# Patient Record
Sex: FEMALE | Race: WHITE | NOT HISPANIC OR LATINO | Employment: OTHER | URBAN - METROPOLITAN AREA
[De-identification: names, ages, dates, MRNs, and addresses within clinical notes are randomized per-mention and may not be internally consistent; named-entity substitution may affect disease eponyms.]

---

## 2023-05-16 ENCOUNTER — CONSULT (OUTPATIENT)
Dept: PAIN MEDICINE | Facility: CLINIC | Age: 88
End: 2023-05-16

## 2023-05-16 VITALS
HEART RATE: 57 BPM | WEIGHT: 98 LBS | TEMPERATURE: 97.5 F | DIASTOLIC BLOOD PRESSURE: 64 MMHG | SYSTOLIC BLOOD PRESSURE: 110 MMHG

## 2023-05-16 DIAGNOSIS — Z87.39 HISTORY OF BURNING PAIN IN LEG: ICD-10-CM

## 2023-05-16 DIAGNOSIS — S22.060A CLOSED WEDGE COMPRESSION FRACTURE OF T8 VERTEBRA, INITIAL ENCOUNTER (HCC): Primary | ICD-10-CM

## 2023-05-16 DIAGNOSIS — M79.604 LEG PAIN, BILATERAL: ICD-10-CM

## 2023-05-16 DIAGNOSIS — M79.605 LEG PAIN, BILATERAL: ICD-10-CM

## 2023-05-16 PROBLEM — M05.79 RHEUMATOID ARTHRITIS INVOLVING MULTIPLE SITES WITH POSITIVE RHEUMATOID FACTOR (HCC): Status: ACTIVE | Noted: 2023-05-16

## 2023-05-16 PROBLEM — N18.32 STAGE 3B CHRONIC KIDNEY DISEASE (HCC): Status: ACTIVE | Noted: 2022-02-17

## 2023-05-16 RX ORDER — CHLORAL HYDRATE 500 MG
1000 CAPSULE ORAL DAILY
COMMUNITY

## 2023-05-16 RX ORDER — ACETAMINOPHEN 650 MG/1
650 SUPPOSITORY RECTAL EVERY 4 HOURS PRN
COMMUNITY

## 2023-05-16 RX ORDER — HYDROXYCHLOROQUINE SULFATE 200 MG/1
200 TABLET, FILM COATED ORAL DAILY
COMMUNITY
Start: 2023-03-21

## 2023-05-16 RX ORDER — GABAPENTIN 100 MG/1
CAPSULE ORAL
Qty: 120 CAPSULE | Refills: 0 | Status: SHIPPED | OUTPATIENT
Start: 2023-05-16

## 2023-05-16 RX ORDER — METOPROLOL SUCCINATE 50 MG/1
50 TABLET, EXTENDED RELEASE ORAL 2 TIMES DAILY
COMMUNITY
Start: 2023-04-09

## 2023-05-16 RX ORDER — HYDRALAZINE HYDROCHLORIDE 10 MG/1
10 TABLET, FILM COATED ORAL 3 TIMES DAILY
COMMUNITY

## 2023-05-16 RX ORDER — FUROSEMIDE 20 MG/1
TABLET ORAL
COMMUNITY
Start: 2023-04-19

## 2023-05-16 RX ORDER — GABAPENTIN 100 MG/1
CAPSULE ORAL
COMMUNITY
Start: 2023-05-12 | End: 2023-05-16 | Stop reason: SDUPTHER

## 2023-05-16 RX ORDER — TITANIUM DIOXIDE, OCTINOXATE, ZINC OXIDE 4.61; 1.6; .78 G/40ML; G/40ML; G/40ML
CREAM TOPICAL
COMMUNITY

## 2023-05-16 NOTE — PROGRESS NOTES
Assessment  1  Closed wedge compression fracture of T8 vertebra, initial encounter ([facility])    2  Leg pain, bilateral    3  History of burning pain in leg        Plan    Pleasant 80year-old female presents today, room for daughter  My recommendation are as follows:    Question is neuropathy versus lumbosacral radiculopathy as well as compression fracture  We will obtain MRI of the thoracic spine to further elucidation of the degree of stenosis secondary to compression fracture, prescription was provided  I think is reasonable obtain an EMG of the bilateral lower limbs to help better differentiate the etiology of her symptoms  Neuropathy versus lumbosacral radiculopathy, prescription was provided  Did slowly titrate the gabapentin to help with her symptoms  I did review the potential side effects of gabapentin, not limited to, but including dizziness, drowsiness, weakness, tired feeling, nausea, diarrhea, constipation, blurred vision, headache, swelling, dry mouth, or loss of balance or coordination  Once we obtain MRI results, I will follow-up with the patient, review the results and current symptoms, and discuss the next steps of the treatment plan  My impressions and treatment recommendations were discussed in detail with the patient who verbalized understanding and had no further questions  Discharge instructions were provided  I personally saw and examined the patient and I agree with the above discussed plan of care  This note is created using dictation transcription  It may contain typographical errors, grammatical errors, improperly dictated words, background noise and other errors  Orders Placed This Encounter   Procedures   • MRI thoracic spine without contrast     Standing Status:   Future     Standing Expiration Date:   5/16/2027     Scheduling Instructions: There is no preparation for this test  Please leave your jewelry and valuables at home, wedding rings are the exception  All patients will be required to change into a hospital gown and pants  Street clothes are not permitted in the MRI  Magnetic nail polish must be removed prior to arrival for your test  Please bring your insurance cards, a form of photo ID and a list of your medications with you  Arrive 15 minutes prior to your appointment time in order to register  Please bring any prior CT or MRI studies of this area that were not performed at a [facility] facility  To schedule this appointment, please contact Central Scheduling at [phone]  Prior to your appointment, please make sure you complete the MRI Screening Form when you e-Check in for your appointment  This will be available starting 7 days before your appointment in [address]  You may receive an e-mail with an activation code if you do not have a [email or web address] account  If you do not have access to a device, we will complete your screening at your appointment  Order Specific Question:   What is the patient's sedation requirement? Answer:   No Sedation     Order Specific Question:   Does this procedure require the 3T MRI at [facility] or [place]?     Answer:   No     Order Specific Question:   Release to patient through [employer]     Answer:   Immediate     Order Specific Question:   Is order priority selected as STAT? Answer:   No     Order Specific Question:   Reason for Exam (FREE TEXT)     Answer:   thoracic compression fracture T8   • EMG 2 limb lower extremity     Standing Status:   Future     Standing Expiration Date:   5/16/2024     Order Specific Question:   Possible Diagnosis:      Answer:   patient scheduled - unknown     New Medications Ordered This Visit   Medications   • furosemide (LASIX) 20 mg tablet     Sig: TAKE 1/2 TABLET DAILY IN THE MORNING AS DIRECTED   • hydroxychloroquine (PLAQUENIL) 200 mg tablet     Sig: Take 200 mg by mouth daily   • metoprolol succinate (TOPROL-XL) 50 mg 24 hr tablet     Sig: Take 50 mg by mouth 2 (two) times a day   • acetaminophen (TYLENOL) 650 mg suppository     Sig: Insert 650 mg into the rectum every 4 (four) hours as needed for mild pain   • Multiple Vitamins-Minerals (ONE DAILY 50 PLUS PO)     Sig: Take by mouth   • Omega-3 Fatty Acids (fish oil) 1,000 mg     Sig: Take 1,000 mg by mouth daily   • Aspirin 81 MG CAPS     Sig: Take by mouth   • Cranberry 400 MG CAPS     Sig: Take by mouth   • hydrALAZINE (APRESOLINE) 10 mg tablet     Sig: Take 10 mg by mouth 3 (three) times a day   • nystatin (MYCOSTATIN) 500,000 units/5 mL suspension     Sig: Apply 500,000 Units to the mouth or throat 4 (four) times a day   • fluocinonide (LIDEX) 0 05 % cream     Sig: Apply topically 2 (two) times a day   • gabapentin (NEURONTIN) 100 mg capsule     Sig: Take one in the Am ans one in the afternon and two at bedtime     Dispense:  120 capsule     Refill:  0     Referred By: Flaco Thomas MD  History of Present Illness    Sd Vale is a 80 y o  female 5-month history of bilateral lower extremity pain numbness and burning from her knees to her toes  She denies any back pain denies any thigh pain or burning  She was found to have a compression fracture of the thoracic spine  Her symptoms are moderate to severe she now is in a wheelchair occasionally uses a walker  She rates as 10 out of 10 the visual analog scale completely interfering with her daily activities  Pain is nearly constant scribes burning cramping and shooting with weakness of her lower limbs  Lying down decreases her symptoms while sitting and walking aggravates her pain  I have personally reviewed and/or updated the patient's past medical history, past surgical history, family history, social history, current medications, allergies, and vital signs today  Review of Systems   Constitutional: Positive for unexpected weight change  Negative for fever  HENT: Negative for trouble swallowing  Eyes: Negative for visual disturbance     Respiratory: Negative for shortness of breath and wheezing  Cardiovascular: Positive for leg swelling  Negative for chest pain and palpitations  Gastrointestinal: Negative for constipation, diarrhea, nausea and vomiting  Endocrine: Positive for polyuria  Negative for cold intolerance, heat intolerance and polydipsia  Genitourinary: Negative for difficulty urinating and frequency  Musculoskeletal: Positive for arthralgias, joint swelling and myalgias  Negative for gait problem  Skin: Positive for wound  Negative for rash  Neurological: Positive for numbness  Negative for dizziness, seizures, syncope, weakness and headaches  Hematological: Does not bruise/bleed easily  Psychiatric/Behavioral: Negative for dysphoric mood  All other systems reviewed and are negative  Patient Active Problem List   Diagnosis   • Stage 3b chronic kidney disease ([facility] )   • Rheumatoid arthritis involving multiple sites with positive rheumatoid factor ([facility] )       Past Medical History:   Diagnosis Date   • Arthritis        History reviewed  No pertinent surgical history  History reviewed  No pertinent family history      Social History     Occupational History   • Not on file   Tobacco Use   • Smoking status: Never   • Smokeless tobacco: Never   Vaping Use   • Vaping Use: Never used   Substance and Sexual Activity   • Alcohol use: Not on file   • Drug use: Never   • Sexual activity: Never       Current Outpatient Medications on File Prior to Visit   Medication Sig   • acetaminophen (TYLENOL) 650 mg suppository Insert 650 mg into the rectum every 4 (four) hours as needed for mild pain   • Aspirin 81 MG CAPS Take by mouth   • Cranberry 400 MG CAPS Take by mouth   • fluocinonide (LIDEX) 0 05 % cream Apply topically 2 (two) times a day   • furosemide (LASIX) 20 mg tablet TAKE 1/2 TABLET DAILY IN THE MORNING AS DIRECTED   • hydrALAZINE (APRESOLINE) 10 mg tablet Take 10 mg by mouth 3 (three) times a day   • hydroxychloroquine (PLAQUENIL) 200 mg tablet Take 200 mg by mouth daily   • metoprolol succinate (TOPROL-XL) 50 mg 24 hr tablet Take 50 mg by mouth 2 (two) times a day   • Multiple Vitamins-Minerals (ONE DAILY 50 PLUS PO) Take by mouth   • nystatin (MYCOSTATIN) 500,000 units/5 mL suspension Apply 500,000 Units to the mouth or throat 4 (four) times a day   • Omega-3 Fatty Acids (fish oil) 1,000 mg Take 1,000 mg by mouth daily   • [DISCONTINUED] gabapentin (NEURONTIN) 100 mg capsule TAKE 1 CAPSULE IN THE AM, AND 2 CAPSULES IN THE PM     No current facility-administered medications on file prior to visit  Allergies   Allergen Reactions   • Erythromycin Hives   • Penicillins Hives   • Sulfa Antibiotics Other (See Comments)   • Tetracycline Hives       Physical Exam    /64 (BP Location: Left arm, Patient Position: Sitting, Cuff Size: Standard)   Pulse 57   Temp 97 5 °F (36 4 °C)   Wt 44 5 kg (98 lb)     Constitutional: normal, well developed, well nourished, alert, in no distress and non-toxic and no overt pain behavior  and underweight  Eyes: anicteric  HEENT: grossly intact  Neck: supple, symmetric, trachea midline and no masses   Pulmonary:even and unlabored  Cardiovascular:No edema or pitting edema present  Skin:Normal without rashes or lesions and well hydrated  Psychiatric:Mood and affect appropriate  Neurologic:Cranial Nerves II-XII grossly intact  Musculoskeletal:normal and in wheelchair, no obvious skin lesion on the thoracolumbar spine there is no tenderness on the thoracic spinous process or lumbar spinous process, generalized weakness there are no focal motor deficit patient lower limbs deep tendon reflex diminished but symmetrical bilateral lower limbs decree sensation in a stocking-like distribution, negative bilateral straight leg raising    Imaging  Xray Thoracic Spine @ [facility] 3-7-23  IMPRESSION: There is a 25% compression fracture at the T8 vertebral body   This fracture is not seen on the chest x-ray of July 13, 2021  The fracture is otherwise of indeterminate age  I have personally reviewed pertinent films in PACS and my interpretation is T8 compression fracture with multilevel spondylosis